# Patient Record
Sex: MALE | Race: BLACK OR AFRICAN AMERICAN | NOT HISPANIC OR LATINO | URBAN - METROPOLITAN AREA
[De-identification: names, ages, dates, MRNs, and addresses within clinical notes are randomized per-mention and may not be internally consistent; named-entity substitution may affect disease eponyms.]

---

## 2018-08-01 ENCOUNTER — EMERGENCY (EMERGENCY)
Facility: HOSPITAL | Age: 28
LOS: 1 days | Discharge: HOME | End: 2018-08-01
Admitting: EMERGENCY MEDICINE
Payer: COMMERCIAL

## 2018-08-01 VITALS
TEMPERATURE: 98 F | HEART RATE: 62 BPM | SYSTOLIC BLOOD PRESSURE: 131 MMHG | RESPIRATION RATE: 14 BRPM | OXYGEN SATURATION: 100 % | DIASTOLIC BLOOD PRESSURE: 75 MMHG

## 2018-08-01 PROCEDURE — 99282 EMERGENCY DEPT VISIT SF MDM: CPT | Mod: 25

## 2018-08-01 PROCEDURE — 99053 MED SERV 10PM-8AM 24 HR FAC: CPT
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
## 2018-08-02 NOTE — ED PROVIDER NOTE - CARE PLAN
Assessment and plan of treatment:	Follow up with your primary care provider within 48 hours  Rinse exposed area   Return to the emergency department with any worsening or concerning symptoms, fever/chills, eye irritation or drainage, blurry vision, rash or any other concerns. Principal Discharge DX:	Exposure to blood or body fluid  Assessment and plan of treatment:	Follow up with your primary care provider within 48 hours  Rinse exposed area   Return to the emergency department with any worsening or concerning symptoms, fever/chills, eye irritation or drainage, blurry vision, rash or any other concerns.

## 2018-08-02 NOTE — ED PROVIDER NOTE - PLAN OF CARE
Follow up with your primary care provider within 48 hours  Rinse exposed area   Return to the emergency department with any worsening or concerning symptoms, fever/chills, eye irritation or drainage, blurry vision, rash or any other concerns.
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
## 2024-01-15 ENCOUNTER — EMERGENCY (EMERGENCY)
Facility: HOSPITAL | Age: 34
LOS: 1 days | Discharge: HOME | End: 2024-01-15
Attending: STUDENT IN AN ORGANIZED HEALTH CARE EDUCATION/TRAINING PROGRAM
Payer: COMMERCIAL

## 2024-01-15 VITALS
TEMPERATURE: 99 F | HEART RATE: 106 BPM | RESPIRATION RATE: 18 BRPM | HEIGHT: 74 IN | SYSTOLIC BLOOD PRESSURE: 151 MMHG | OXYGEN SATURATION: 95 % | DIASTOLIC BLOOD PRESSURE: 79 MMHG | WEIGHT: 205.03 LBS

## 2024-01-15 VITALS
TEMPERATURE: 98 F | RESPIRATION RATE: 17 BRPM | OXYGEN SATURATION: 98 % | HEART RATE: 76 BPM | DIASTOLIC BLOOD PRESSURE: 63 MMHG | SYSTOLIC BLOOD PRESSURE: 114 MMHG

## 2024-01-15 LAB
FLUAV AG NPH QL: SIGNIFICANT CHANGE UP
FLUBV AG NPH QL: SIGNIFICANT CHANGE UP
RSV RNA NPH QL NAA+NON-PROBE: SIGNIFICANT CHANGE UP
S PYO AG SPEC QL IA: NEGATIVE — SIGNIFICANT CHANGE UP
SARS-COV-2 RNA SPEC QL NAA+PROBE: SIGNIFICANT CHANGE UP

## 2024-01-15 PROCEDURE — 99284 EMERGENCY DEPT VISIT MOD MDM: CPT

## 2024-01-15 PROCEDURE — 87637 SARSCOV2&INF A&B&RSV AMP PRB: CPT

## 2024-01-15 PROCEDURE — 99283 EMERGENCY DEPT VISIT LOW MDM: CPT | Mod: 25

## 2024-01-15 PROCEDURE — 87880 STREP A ASSAY W/OPTIC: CPT

## 2024-01-15 PROCEDURE — 96372 THER/PROPH/DIAG INJ SC/IM: CPT

## 2024-01-15 PROCEDURE — 87081 CULTURE SCREEN ONLY: CPT

## 2024-01-15 RX ORDER — DEXAMETHASONE 0.5 MG/5ML
10 ELIXIR ORAL ONCE
Refills: 0 | Status: COMPLETED | OUTPATIENT
Start: 2024-01-15 | End: 2024-01-15

## 2024-01-15 RX ORDER — KETOROLAC TROMETHAMINE 30 MG/ML
15 SYRINGE (ML) INJECTION ONCE
Refills: 0 | Status: DISCONTINUED | OUTPATIENT
Start: 2024-01-15 | End: 2024-01-15

## 2024-01-15 RX ORDER — DEXAMETHASONE 0.5 MG/5ML
10 ELIXIR ORAL ONCE
Refills: 0 | Status: DISCONTINUED | OUTPATIENT
Start: 2024-01-15 | End: 2024-01-15

## 2024-01-15 RX ADMIN — Medication 10 MILLIGRAM(S): at 02:43

## 2024-01-15 RX ADMIN — Medication 15 MILLIGRAM(S): at 02:24

## 2024-01-15 NOTE — ED PROVIDER NOTE - ATTENDING CONTRIBUTION TO CARE
Attending ROSALINDA Gatica I performed a history and physical exam of the patient and discussed their management with the resident. I reviewed the resident's note and agree with the documented findings and plan of care, except as noted. My medical decision making and observations are as follows:    33 M with no PMH presenting with sore throat and fever x 2 days with associated cough, nasal congestion.  Presented to urgent care on day of symptom onset, was directed to take Tylenol and numbing oral spray with minimal relief.  Reporting decreased p.o. intake due to pain with swallowing.  No chest pain, shortness of breath, abdominal pain, GI symptoms, dysuria.  On exam, patient in no acute distress, nontoxic appearing.  Bilateral tonsil erythema and edema with exudates.  Tolerating secretions, no cervical lymphadenopathy, heart and lungs clear to auscultation, abdomen soft nontender.    MDM–otherwise healthy young male presenting with symptoms concerning for acute pharyngitis, will evaluate for strep but likely viral given other associated symptoms.  Mildly tachycardic on vitals, otherwise stable.  Offered blood work versus symptomatic treatment and reassessment; patient opting for conservative evaluation with viral swab, throat culture, rapid strep, and medications.  Will treat with Toradol and oral dexamethasone.  Rapid strep negative, patient endorsing symptomatic improvement.  Stable for discharge with PMD and ENT follow-up.

## 2024-01-15 NOTE — ED ADULT NURSE NOTE - OBJECTIVE STATEMENT
32y/o male walked into ED a&ox4 c/o throat pain. States he has been having increased throat pain and swelling x3 days. Also with fever, body aches, HA, runny nose. Denies sob, cp, abd pain, N/V/D, weakness. No medical history. 34y/o male walked into ED a&ox4 c/o throat pain. States he has been having increased throat pain and swelling x3 days. Also with fever, body aches, HA, runny nose. Denies sob, cp, abd pain, N/V/D, weakness. No medical history.

## 2024-01-15 NOTE — ED PROVIDER NOTE - OBJECTIVE STATEMENT
33-year-old male with no past medical history presents to the ED with complaints of tonsillar swelling, fever x 2 days,.  Patient states that fevers began 2 days ago with associated cough, congestion, sore throat, rhinorrhea, coryza, and painful swollen tonsils.  Patient states that he gets some type of tonsillitis once a year.  Patient states that it has been difficult to have intake of his normal amount of water and food hurts to swallow.  Patient was seen at urgent care 2 days ago and was told to take Tylenoln and was given a numbing spray but has had little relief.  Patient last took Tylenol this afternoon.  Patient denies difficulty breathing, pooling secretions, jaw pain, headache, dizziness, chest pain, nausea, vomiting, diarrhea, dysuria. Denies daily alcohol use, tobacco use, recreational drugs. Patient states he does occasionally smoke hookah on the weekends.

## 2024-01-15 NOTE — ED PROVIDER NOTE - PHYSICAL EXAMINATION
Gen: NAD, AAOx3, non-toxic appearing, appears slightly lethargic  HEENT: NCAT, normal conjunctiva, oral mucosa moist, no pooling secretions, no external neck masses, no cervical lymphadenopathy, large BL swollen, erythematous tonsils with overlying whitish exudate  Lung: speaking in full sentences, unlabored breathing, good aeration bilaterally, lungs CTA b/l  CV: regular rate and rhythm. cap refill <2x. peripheral pulses 2+bilaterally   Abd: soft, ND, NT  MSK: no visible deformities  Neuro: No focal deficits  Skin: Intact, very warm to the touch  Psych: normal affect

## 2024-01-15 NOTE — ED PROVIDER NOTE - PROGRESS NOTE DETAILS
Sonya Lai: Reassessed patient and he is feeling some relief, but not complete relief.  Spoke to patient about negative strep rapid test, and negative viral panel.  Discussed with patient the known necessity of antibiotics at this time,  But if strep culture is positive we will contact him and send antibiotics.  Patient understands and is enthusiastic with plan.  Also discussed ENT follow-up, as patient has had similar episodes of tonsillitis for many years.

## 2024-01-15 NOTE — ED PROVIDER NOTE - PATIENT PORTAL LINK FT
You can access the FollowMyHealth Patient Portal offered by [facility] by registering at the following website: [email or web address] By joining [employer]’s FollowMyHealth portal, you will also be able to view your health information using other applications (apps) compatible with our system. You can access the FollowMyHealth Patient Portal offered by [facility] by registering at the following website: [email or web address]. By joining [employer]’s FollowMyHealth portal, you will also be able to view your health information using other applications (apps) compatible with our system. You can access the FollowMyHealth Patient Portal offered by [facility] by registering at the following website: [email or web address]. By joining [employer]’s FollowMyHealth portal, you will also be able to view your health information using other applications (apps) compatible with our system. You can access the FollowMyHealth Patient Portal offered by [facility] by registering at the following website: [email or web address]. By joining [employer]’s FollowMyHealth portal, you will also be able to view your health information using other applications (apps) compatible with our system.

## 2024-01-15 NOTE — ED PROVIDER NOTE - CHILD ABUSE FACILITY
[facility]

## 2024-11-10 ENCOUNTER — NON-APPOINTMENT (OUTPATIENT)
Age: 34
End: 2024-11-10